# Patient Record
Sex: MALE | Race: WHITE | Employment: OTHER | URBAN - METROPOLITAN AREA
[De-identification: names, ages, dates, MRNs, and addresses within clinical notes are randomized per-mention and may not be internally consistent; named-entity substitution may affect disease eponyms.]

---

## 2023-01-04 ENCOUNTER — OFFICE VISIT (OUTPATIENT)
Dept: CARDIOLOGY CLINIC | Age: 78
End: 2023-01-04
Payer: MEDICARE

## 2023-01-04 VITALS
SYSTOLIC BLOOD PRESSURE: 134 MMHG | HEART RATE: 96 BPM | WEIGHT: 197 LBS | BODY MASS INDEX: 30.92 KG/M2 | HEIGHT: 67 IN | DIASTOLIC BLOOD PRESSURE: 90 MMHG

## 2023-01-04 DIAGNOSIS — I10 ESSENTIAL HYPERTENSION: ICD-10-CM

## 2023-01-04 DIAGNOSIS — I25.10 CORONARY ARTERY DISEASE INVOLVING NATIVE CORONARY ARTERY OF NATIVE HEART WITHOUT ANGINA PECTORIS: Primary | ICD-10-CM

## 2023-01-04 DIAGNOSIS — I45.10 RIGHT BUNDLE BRANCH BLOCK: ICD-10-CM

## 2023-01-04 DIAGNOSIS — E78.2 MIXED HYPERLIPIDEMIA: ICD-10-CM

## 2023-01-04 PROCEDURE — 1036F TOBACCO NON-USER: CPT | Performed by: INTERNAL MEDICINE

## 2023-01-04 PROCEDURE — 3080F DIAST BP >= 90 MM HG: CPT | Performed by: INTERNAL MEDICINE

## 2023-01-04 PROCEDURE — G8428 CUR MEDS NOT DOCUMENT: HCPCS | Performed by: INTERNAL MEDICINE

## 2023-01-04 PROCEDURE — G8419 CALC BMI OUT NRM PARAM NOF/U: HCPCS | Performed by: INTERNAL MEDICINE

## 2023-01-04 PROCEDURE — 3075F SYST BP GE 130 - 139MM HG: CPT | Performed by: INTERNAL MEDICINE

## 2023-01-04 PROCEDURE — 1123F ACP DISCUSS/DSCN MKR DOCD: CPT | Performed by: INTERNAL MEDICINE

## 2023-01-04 PROCEDURE — 99215 OFFICE O/P EST HI 40 MIN: CPT | Performed by: INTERNAL MEDICINE

## 2023-01-04 PROCEDURE — 93000 ELECTROCARDIOGRAM COMPLETE: CPT | Performed by: INTERNAL MEDICINE

## 2023-01-04 PROCEDURE — G8484 FLU IMMUNIZE NO ADMIN: HCPCS | Performed by: INTERNAL MEDICINE

## 2023-01-04 RX ORDER — LISINOPRIL 30 MG/1
30 TABLET ORAL DAILY
COMMUNITY
Start: 2022-10-01 | End: 2023-01-04 | Stop reason: SDUPTHER

## 2023-01-04 RX ORDER — LISINOPRIL 40 MG/1
40 TABLET ORAL DAILY
Qty: 90 TABLET | Refills: 3 | Status: SHIPPED | OUTPATIENT
Start: 2023-01-04

## 2023-01-04 RX ORDER — ATORVASTATIN CALCIUM 40 MG/1
40 TABLET, FILM COATED ORAL
COMMUNITY
Start: 2020-01-17

## 2023-01-04 NOTE — PROGRESS NOTES
[address], [address] [place] [address]  PHONE: [phone]    SUBJECTIVE: Harvey Tovar (1945) is a 69 y.o. male seen for a follow up visit regarding the following:   Specialty Problems          Cardiology Problems    Coronary artery disease involving native coronary artery of native heart without angina pectoris        Essential hypertension        Hyperlipidemia        Right bundle branch block         Patient presents for routine yearly follow-up he was previously been seen by Dr. 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 His blood pressure has been running high recently and we will increase his lisinopril from 30 mg daily to 40 mg daily    Past Medical History, Past Surgical History, Family history, Social History, and Medications were all reviewed with the patient today and updated as necessary. No Known Allergies  Past Medical History:   Diagnosis Date    Coronary atherosclerosis of native coronary vessel 10/29/2015    Essential hypertension 10/29/2015    Hyperlipidemia 10/29/2015    Right bundle branch block 10/29/2015     No past surgical history on file. Family History   Problem Relation Age of Onset    No Known Problems Mother     Heart Disease Father     Hypertension Father       Social History     Tobacco Use    Smoking status: Former    Smokeless tobacco: Never   Substance Use Topics    Alcohol use:  Yes     Alcohol/week: 14.0 standard drinks       Current Outpatient Medications:     atorvastatin (LIPITOR) 40 MG tablet, Take 40 mg by mouth, Disp: , Rfl:     lisinopril (PRINIVIL;ZESTRIL) 30 MG tablet, Take 30 mg by mouth daily, Disp: , Rfl:     Multiple Vitamin (MULTI-VITAMIN DAILY PO), Take by mouth, Disp: , Rfl:     ROS:  Review of Systems - General ROS: negative for - chills, fatigue or fever  Hematological and Lymphatic ROS: negative for - bleeding problems, bruising or jaundice  Respiratory ROS: no cough, shortness of breath, or wheezing  Cardiovascular ROS: no chest pain or dyspnea on exertion  Gastrointestinal ROS: no abdominal pain, change in bowel habits, or black or bloody stools  Neurological ROS: no TIA or stroke symptoms  All other systems negative. Wt Readings from Last 3 Encounters:   01/04/23 197 lb (89.4 kg)   01/31/22 211 lb (95.7 kg)     Temp Readings from Last 3 Encounters:   No data found for Temp     BP Readings from Last 3 Encounters:   01/04/23 (!) 154/94   01/31/22 (!) 142/88     Pulse Readings from Last 3 Encounters:   01/04/23 96   01/31/22 80       PHYSICAL EXAM:  BP (!) 154/94   Pulse 96   Ht 5' 7\" (1.702 m)   Wt 197 lb (89.4 kg)   BMI 30.85 kg/m²   Physical Examination: General appearance - alert, well appearing, and in no distress  Mental status - alert, oriented to person, place, and time  Eyes - pupils equal and reactive, extraocular eye movements intact  Neck/lymph - supple, no significant adenopathy  Chest/lungs - clear to auscultation, no wheezes, rales or rhonchi, symmetric air entry  Heart/CV - normal rate, regular rhythm, normal S1, S2, no murmurs, rubs, clicks or gallops  Abdomen/GI - soft, nontender, nondistended, no masses or organomegaly  Musculoskeletal - no joint tenderness, deformity or swelling  Extremities - peripheral pulses normal, no pedal edema, no clubbing or cyanosis  Skin - normal coloration and turgor, no rashes, no suspicious skin lesions noted    EKG: normal EKG, normal sinus rhythm, RBBB.          Medications reviewed and questions answered    No results found for this or any previous visit (from the past 672 hour(s)). Lab Results   Component Value Date/Time    CHOL 129 12/18/2019 09:35 AM    HDL 46 12/18/2019 09:35 AM       ASSESSMENT and PLAN  Problem List Items Addressed This Visit          Circulatory    Coronary artery disease involving native coronary artery of native heart without angina pectoris - Primary    Relevant Medications    atorvastatin (LIPITOR) 40 MG tablet    lisinopril (PRINIVIL;ZESTRIL) 30 MG tablet    Other Relevant Orders    EKG 12 lead (Completed)    Essential hypertension    Relevant Orders    EKG 12 lead (Completed)    Right bundle branch block    Relevant Medications    atorvastatin (LIPITOR) 40 MG tablet    lisinopril (PRINIVIL;ZESTRIL) 30 MG tablet    Other Relevant Orders    EKG 12 lead (Completed)       Other    Hyperlipidemia    Relevant Medications    atorvastatin (LIPITOR) 40 MG tablet    lisinopril (PRINIVIL;ZESTRIL) 30 MG tablet    Other Relevant Orders    EKG 12 lead (Completed)      Coronary artery disease  Lipids are addressed quite well and we will continue the Lipitor 40 p.o. daily. Patient does fit into a category where low-dose 81 mg aspirin therapy is guideline recommended    Hypertension  Increase lisinopril from 30 mg daily to 40 mg daily    Continue meds as below    Current Outpatient Medications:     atorvastatin (LIPITOR) 40 MG tablet, Take 40 mg by mouth, Disp: , Rfl:     lisinopril (PRINIVIL;ZESTRIL) 30 MG tablet, Take 30 mg by mouth daily, Disp: , Rfl:     Multiple Vitamin (MULTI-VITAMIN DAILY PO), Take by mouth, Disp: , Rfl:     Lincoln Berman MD  1/4/2023  11:12 AM    Pt is instructed to follow all appropriate cardiac risk factor recommendations and to be compliant with meds and testing. Instructed to follow up appropriately and seek urgent medical care if acute or unstable issues arise.  Results of some tests may be viewed thru [address] but this does not substitute for follow up with MD. If follow up is not scheduled pt is instructed to call for follow up

## 2023-04-26 ENCOUNTER — OFFICE VISIT (OUTPATIENT)
Dept: CARDIOLOGY CLINIC | Age: 78
End: 2023-04-26
Payer: MEDICARE

## 2023-04-26 VITALS
WEIGHT: 196 LBS | SYSTOLIC BLOOD PRESSURE: 130 MMHG | HEIGHT: 67 IN | HEART RATE: 79 BPM | DIASTOLIC BLOOD PRESSURE: 88 MMHG | BODY MASS INDEX: 30.76 KG/M2

## 2023-04-26 DIAGNOSIS — E78.2 MIXED HYPERLIPIDEMIA: ICD-10-CM

## 2023-04-26 DIAGNOSIS — I25.10 CORONARY ARTERY DISEASE INVOLVING NATIVE CORONARY ARTERY OF NATIVE HEART WITHOUT ANGINA PECTORIS: Primary | ICD-10-CM

## 2023-04-26 DIAGNOSIS — I10 ESSENTIAL HYPERTENSION: ICD-10-CM

## 2023-04-26 DIAGNOSIS — I45.10 RIGHT BUNDLE BRANCH BLOCK: ICD-10-CM

## 2023-04-26 PROCEDURE — G8417 CALC BMI ABV UP PARAM F/U: HCPCS | Performed by: INTERNAL MEDICINE

## 2023-04-26 PROCEDURE — 1123F ACP DISCUSS/DSCN MKR DOCD: CPT | Performed by: INTERNAL MEDICINE

## 2023-04-26 PROCEDURE — 1036F TOBACCO NON-USER: CPT | Performed by: INTERNAL MEDICINE

## 2023-04-26 PROCEDURE — 3079F DIAST BP 80-89 MM HG: CPT | Performed by: INTERNAL MEDICINE

## 2023-04-26 PROCEDURE — 3075F SYST BP GE 130 - 139MM HG: CPT | Performed by: INTERNAL MEDICINE

## 2023-04-26 PROCEDURE — 99214 OFFICE O/P EST MOD 30 MIN: CPT | Performed by: INTERNAL MEDICINE

## 2023-04-26 PROCEDURE — G8428 CUR MEDS NOT DOCUMENT: HCPCS | Performed by: INTERNAL MEDICINE

## 2023-04-26 NOTE — PROGRESS NOTES
[address], [address] [place] [address], [address] [place] [address]  PHONE: [phone]    SUBJECTIVE: Maryjane Miranda (1945) is a 69 y.o. male seen for a follow up visit regarding the following:   Specialty Problems          Cardiology Problems    Coronary artery disease involving native coronary artery of native heart without angina pectoris        Essential hypertension        Hyperlipidemia        Right bundle branch block         Patient presents for routine yearly follow-up he was previously been seen by Dr. 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 His blood pressure has been running high recently and we will increase his lisinopril from 30 mg daily to 40 mg daily    4/26/23  Pt doing well. No chest pain. No palpitations. Patient denies syncope. No dyspnea. States they are taking meds. Maintains a normal level of activity for them without symptoms. No dizziness or lightheadedness. All above conditions stable. Past Medical History, Past Surgical History, Family history, Social History, and Medications were all reviewed with the patient today and updated as necessary. No Known Allergies  Past Medical History:   Diagnosis Date    Coronary atherosclerosis of native coronary vessel 10/29/2015    Essential hypertension 10/29/2015    Hyperlipidemia 10/29/2015    Right bundle branch block 10/29/2015     No past surgical history on file.   Family History   Problem Relation Age of Onset    No Known Problems Mother     Heart Disease Father     Hypertension Father       Social

## 2024-04-24 NOTE — PROGRESS NOTES
[facility] CARDIOLOGY, [address], [address]  [place] [address]  PHONE: [phone]    SUBJECTIVE: /HPI  Vivek Barker (1945) is a 78 y.o. male seen for a follow up visit regarding the following:   Specialty Problems          Cardiology Problems    Coronary artery disease involving native coronary artery of native heart without angina pectoris        Essential hypertension        Hyperlipidemia        Right bundle branch block         Vivek Barker is a 79 y/o male presenting for her yearly exam. His past medical history is significant for hypertension, RBBB, hyperlipidemia, and CAD. The patient currently denies any chest pain, shortness of breath, headaches, dizziness, fatigue, syncope, and/or weight loss.     Past Medical History, Past Surgical History, Family history, Social History, and Medications were all reviewed with the patient today and updated as necessary.    No Known Allergies  Past Medical History:   Diagnosis Date    Coronary atherosclerosis of native coronary vessel 10/29/2015    Essential hypertension 10/29/2015    Hyperlipidemia 10/29/2015    Right bundle branch block 10/29/2015     No past surgical history on file.  Family History   Problem Relation Age of Onset    No Known Problems Mother     Heart Disease Father     Hypertension Father       Social History     Tobacco Use    Smoking status: Former    Smokeless tobacco: Never   Substance Use Topics    Alcohol use: Yes     Alcohol/week: 14.0 standard drinks of alcohol       Current Outpatient Medications:     ASPIRIN 81 PO, Take 81 mg by mouth daily, Disp: , Rfl:     atorvastatin (LIPITOR) 40 MG tablet, Take 1 tablet by mouth, Disp: , Rfl:     Multiple Vitamin (MULTI-VITAMIN DAILY PO), Take by mouth, Disp: , Rfl:     lisinopril (PRINIVIL;ZESTRIL) 40 MG tablet, Take 1 tablet by mouth daily, Disp: 90 tablet, Rfl: 3    ROS:  Review of Systems - General ROS: negative for - chills, fatigue or fever  Hematological and Lymphatic

## 2024-04-25 ENCOUNTER — OFFICE VISIT (OUTPATIENT)
Age: 79
End: 2024-04-25
Payer: MEDICARE

## 2024-04-25 VITALS
BODY MASS INDEX: 30.36 KG/M2 | HEART RATE: 80 BPM | WEIGHT: 205 LBS | SYSTOLIC BLOOD PRESSURE: 144 MMHG | HEIGHT: 69 IN | DIASTOLIC BLOOD PRESSURE: 92 MMHG

## 2024-04-25 DIAGNOSIS — I45.10 RIGHT BUNDLE BRANCH BLOCK: ICD-10-CM

## 2024-04-25 DIAGNOSIS — I25.10 CORONARY ARTERY DISEASE INVOLVING NATIVE CORONARY ARTERY OF NATIVE HEART WITHOUT ANGINA PECTORIS: Primary | ICD-10-CM

## 2024-04-25 DIAGNOSIS — I10 ESSENTIAL HYPERTENSION: ICD-10-CM

## 2024-04-25 PROCEDURE — 1123F ACP DISCUSS/DSCN MKR DOCD: CPT | Performed by: INTERNAL MEDICINE

## 2024-04-25 PROCEDURE — G8417 CALC BMI ABV UP PARAM F/U: HCPCS | Performed by: INTERNAL MEDICINE

## 2024-04-25 PROCEDURE — 1036F TOBACCO NON-USER: CPT | Performed by: INTERNAL MEDICINE

## 2024-04-25 PROCEDURE — 93000 ELECTROCARDIOGRAM COMPLETE: CPT | Performed by: INTERNAL MEDICINE

## 2024-04-25 PROCEDURE — 99214 OFFICE O/P EST MOD 30 MIN: CPT | Performed by: INTERNAL MEDICINE

## 2024-04-25 PROCEDURE — 3077F SYST BP >= 140 MM HG: CPT | Performed by: INTERNAL MEDICINE

## 2024-04-25 PROCEDURE — G8428 CUR MEDS NOT DOCUMENT: HCPCS | Performed by: INTERNAL MEDICINE

## 2024-04-25 PROCEDURE — 3080F DIAST BP >= 90 MM HG: CPT | Performed by: INTERNAL MEDICINE

## 2024-04-25 RX ORDER — LATANOPROST 50 UG/ML
SOLUTION/ DROPS OPHTHALMIC
COMMUNITY
Start: 2024-03-05

## 2025-06-11 ENCOUNTER — OFFICE VISIT (OUTPATIENT)
Age: 80
End: 2025-06-11
Payer: MEDICARE

## 2025-06-11 VITALS
SYSTOLIC BLOOD PRESSURE: 138 MMHG | DIASTOLIC BLOOD PRESSURE: 90 MMHG | HEIGHT: 69 IN | BODY MASS INDEX: 30.36 KG/M2 | HEART RATE: 73 BPM | WEIGHT: 205 LBS

## 2025-06-11 DIAGNOSIS — I10 ESSENTIAL HYPERTENSION: Primary | ICD-10-CM

## 2025-06-11 DIAGNOSIS — E78.01 FAMILIAL HYPERCHOLESTEROLEMIA: ICD-10-CM

## 2025-06-11 DIAGNOSIS — I25.10 CORONARY ARTERY DISEASE INVOLVING NATIVE CORONARY ARTERY OF NATIVE HEART WITHOUT ANGINA PECTORIS: ICD-10-CM

## 2025-06-11 PROCEDURE — 1126F AMNT PAIN NOTED NONE PRSNT: CPT | Performed by: INTERNAL MEDICINE

## 2025-06-11 PROCEDURE — 1123F ACP DISCUSS/DSCN MKR DOCD: CPT | Performed by: INTERNAL MEDICINE

## 2025-06-11 PROCEDURE — 3080F DIAST BP >= 90 MM HG: CPT | Performed by: INTERNAL MEDICINE

## 2025-06-11 PROCEDURE — 99214 OFFICE O/P EST MOD 30 MIN: CPT | Performed by: INTERNAL MEDICINE

## 2025-06-11 PROCEDURE — 93000 ELECTROCARDIOGRAM COMPLETE: CPT | Performed by: INTERNAL MEDICINE

## 2025-06-11 PROCEDURE — G8428 CUR MEDS NOT DOCUMENT: HCPCS | Performed by: INTERNAL MEDICINE

## 2025-06-11 PROCEDURE — 1036F TOBACCO NON-USER: CPT | Performed by: INTERNAL MEDICINE

## 2025-06-11 PROCEDURE — 3075F SYST BP GE 130 - 139MM HG: CPT | Performed by: INTERNAL MEDICINE

## 2025-06-11 PROCEDURE — G8417 CALC BMI ABV UP PARAM F/U: HCPCS | Performed by: INTERNAL MEDICINE

## 2025-06-11 NOTE — PROGRESS NOTES
[facility] CARDIOLOGY, [address], [address]  [place] [address]  PHONE: [phone]    SUBJECTIVE: /HPI  Vivek Barker (1945) is a 79 y.o. male seen for a follow up visit regarding the following:   Specialty Problems          Cardiology Problems    Coronary artery disease involving native coronary artery of native heart without angina pectoris        Essential hypertension        Hyperlipidemia        Right bundle branch block         Patient in for routine follow-up doing well with no complaints blood pressure 146/90.  Patient has previously indicated that home blood pressure management shows blood pressures closer to guideline directed numbers.  Latest LDL is 62.  Latest renal function is normal    Past Medical History, Past Surgical History, Family history, Social History, and Medications were all reviewed with the patient today and updated as necessary.    No Known Allergies  Past Medical History:   Diagnosis Date    Coronary atherosclerosis of native coronary vessel 10/29/2015    Essential hypertension 10/29/2015    Hyperlipidemia 10/29/2015    Right bundle branch block 10/29/2015     No past surgical history on file.  Family History   Problem Relation Age of Onset    No Known Problems Mother     Heart Disease Father     Hypertension Father       Social History     Tobacco Use    Smoking status: Former    Smokeless tobacco: Never   Substance Use Topics    Alcohol use: Yes     Alcohol/week: 14.0 standard drinks of alcohol       Current Outpatient Medications:     latanoprost (XALATAN) 0.005 % ophthalmic solution, LOCATION: BOTH EYES. INSTILL ONE DROP INTO BOTH EYES EACH NIGHT., Disp: , Rfl:     atorvastatin (LIPITOR) 40 MG tablet, Take 1 tablet by mouth, Disp: , Rfl:     Multiple Vitamin (MULTI-VITAMIN DAILY PO), Take by mouth, Disp: , Rfl:     lisinopril (PRINIVIL;ZESTRIL) 40 MG tablet, Take 1 tablet by mouth daily, Disp: 90 tablet, Rfl: 3    ASPIRIN 81 PO, Take 81 mg by mouth daily